# Patient Record
Sex: FEMALE | Race: WHITE | NOT HISPANIC OR LATINO
[De-identification: names, ages, dates, MRNs, and addresses within clinical notes are randomized per-mention and may not be internally consistent; named-entity substitution may affect disease eponyms.]

---

## 2020-07-18 ENCOUNTER — TRANSCRIPTION ENCOUNTER (OUTPATIENT)
Age: 35
End: 2020-07-18

## 2020-08-17 ENCOUNTER — TRANSCRIPTION ENCOUNTER (OUTPATIENT)
Age: 35
End: 2020-08-17

## 2020-12-02 ENCOUNTER — TRANSCRIPTION ENCOUNTER (OUTPATIENT)
Age: 35
End: 2020-12-02

## 2021-04-05 ENCOUNTER — APPOINTMENT (OUTPATIENT)
Dept: PULMONOLOGY | Facility: CLINIC | Age: 36
End: 2021-04-05

## 2021-12-23 ENCOUNTER — TRANSCRIPTION ENCOUNTER (OUTPATIENT)
Age: 36
End: 2021-12-23

## 2022-04-11 ENCOUNTER — TRANSCRIPTION ENCOUNTER (OUTPATIENT)
Age: 37
End: 2022-04-11

## 2022-09-09 ENCOUNTER — NON-APPOINTMENT (OUTPATIENT)
Age: 37
End: 2022-09-09

## 2023-04-02 ENCOUNTER — NON-APPOINTMENT (OUTPATIENT)
Age: 38
End: 2023-04-02

## 2023-04-06 ENCOUNTER — OUTPATIENT (OUTPATIENT)
Dept: OUTPATIENT SERVICES | Facility: HOSPITAL | Age: 38
LOS: 1 days | End: 2023-04-06
Payer: COMMERCIAL

## 2023-04-06 ENCOUNTER — APPOINTMENT (OUTPATIENT)
Dept: CT IMAGING | Facility: CLINIC | Age: 38
End: 2023-04-06

## 2023-04-06 DIAGNOSIS — Z00.8 ENCOUNTER FOR OTHER GENERAL EXAMINATION: ICD-10-CM

## 2023-04-06 PROCEDURE — 74176 CT ABD & PELVIS W/O CONTRAST: CPT | Mod: 26

## 2023-04-06 PROCEDURE — 74176 CT ABD & PELVIS W/O CONTRAST: CPT

## 2023-04-17 DIAGNOSIS — Z01.812 ENCOUNTER FOR PREPROCEDURAL LABORATORY EXAMINATION: ICD-10-CM

## 2023-04-18 ENCOUNTER — APPOINTMENT (OUTPATIENT)
Dept: MATERNAL FETAL MEDICINE | Facility: CLINIC | Age: 38
End: 2023-04-18
Payer: COMMERCIAL

## 2023-04-18 ENCOUNTER — APPOINTMENT (OUTPATIENT)
Dept: ANTEPARTUM | Facility: CLINIC | Age: 38
End: 2023-04-18

## 2023-04-18 VITALS
OXYGEN SATURATION: 99 % | RESPIRATION RATE: 16 BRPM | DIASTOLIC BLOOD PRESSURE: 100 MMHG | HEART RATE: 68 BPM | WEIGHT: 211 LBS | BODY MASS INDEX: 33.91 KG/M2 | SYSTOLIC BLOOD PRESSURE: 162 MMHG | HEIGHT: 66 IN

## 2023-04-18 DIAGNOSIS — Z63.4 DISAPPEARANCE AND DEATH OF FAMILY MEMBER: ICD-10-CM

## 2023-04-18 DIAGNOSIS — Z82.49 FAMILY HISTORY OF ISCHEMIC HEART DISEASE AND OTHER DISEASES OF THE CIRCULATORY SYSTEM: ICD-10-CM

## 2023-04-18 DIAGNOSIS — Z31.7 ENCOUNTER FOR PROCREATIVE MANAGEMENT AND COUNSELING FOR GESTATIONAL CARRIER: ICD-10-CM

## 2023-04-18 DIAGNOSIS — Z31.69 ENCOUNTER FOR OTHER GENERAL COUNSELING AND ADVICE ON PROCREATION: ICD-10-CM

## 2023-04-18 DIAGNOSIS — E66.9 OBESITY, UNSPECIFIED: ICD-10-CM

## 2023-04-18 DIAGNOSIS — Z87.59 PERSONAL HISTORY OF OTHER COMPLICATIONS OF PREGNANCY, CHILDBIRTH AND THE PUERPERIUM: ICD-10-CM

## 2023-04-18 DIAGNOSIS — Z87.51 PERSONAL HISTORY OF PRE-TERM LABOR: ICD-10-CM

## 2023-04-18 DIAGNOSIS — Z84.89 FAMILY HISTORY OF OTHER SPECIFIED CONDITIONS: ICD-10-CM

## 2023-04-18 DIAGNOSIS — R54 AGE-RELATED PHYSICAL DEBILITY: ICD-10-CM

## 2023-04-18 DIAGNOSIS — Z86.59 PERSONAL HISTORY OF OTHER COMPLICATIONS OF PREGNANCY, CHILDBIRTH AND THE PUERPERIUM: ICD-10-CM

## 2023-04-18 DIAGNOSIS — R03.0 ELEVATED BLOOD-PRESSURE READING, W/OUT DIAGNOSIS OF HYPERTENSION: ICD-10-CM

## 2023-04-18 PROCEDURE — 99205 OFFICE O/P NEW HI 60 MIN: CPT

## 2023-04-18 SDOH — SOCIAL STABILITY - SOCIAL INSECURITY: DISSAPEARANCE AND DEATH OF FAMILY MEMBER: Z63.4

## 2023-04-27 PROBLEM — E66.9 OBESITY: Status: ACTIVE | Noted: 2023-04-18

## 2023-04-27 PROBLEM — R03.0 ELEVATED BP WITHOUT DIAGNOSIS OF HYPERTENSION: Status: ACTIVE | Noted: 2023-04-27

## 2023-04-27 PROBLEM — Z31.69 ENCOUNTER FOR PRECONCEPTION CONSULTATION: Status: ACTIVE | Noted: 2023-04-18

## 2023-04-27 PROBLEM — R54 ADVANCED AGE: Status: ACTIVE | Noted: 2023-04-18

## 2023-04-30 NOTE — ACTIVE PROBLEMS
[Diabetes Mellitus] : no diabetes mellitus [Hypertension] : no hypertension [Heart Disease] : no heart disease [Autoimmune Disease] : no autoimmune disease [Renal Disease] : no kidney disease, no UTI [Neurologic Disorder] : no neurologic disorder, no epilepsy [Psychiatric Disorders] : no psychiatric disorders [Depression] : no depression, no post partum depression [Hepatic Disorder] : no hepatitis, no liver disease [Thrombophlebitis] : no varicosities, no phlebitis [Thyroid Disorder] : no thyroid dysfunction [Trauma] : no trauma/violence [Blood Transfusion (___ Ml)] : no history of blood transfusion [FreeTextEntry1] : Patient and her  are SMA carriers\par

## 2023-04-30 NOTE — DATA REVIEWED
[FreeTextEntry1] : 3/13/23 - ultrasound with bulky uterus, 10 week size, endometrial lining 7mm, IUD in place, ovaries sonographically normal\par \par 3/14/23 - PAP negative for intraepithelial lesion or malignancy, HPV not detected\par \par 23 - CT abdomen/pelvis - 2.5 cm nodular density of the left lower quadrant abdominal wall at the level of the  section scar.  4 mm right lower lobe pulmonary nodule.

## 2023-04-30 NOTE — DISCUSSION/SUMMARY
[FreeTextEntry1] : Chantal Ames present for preconceptual counseling as she is applying to become a gestational carrier.  She presents to discuss risks to any future pregnancy. \par \par Regarding potential genetic risk, she is AMA and a SMA carrier.  She understands that as a gestational carrier, she would not be using her own oocytes for conception, so the genetic risks associated with AMA and SMA are not clinically significant. However, the risks for preeclampsia,  labor, fetal growth restriction, stillbirth and  delivery all increase with increasing maternal age.  Low dose aspirin therapy is recommended as in her prior pregnancies.  Serial ultrasound evaluation of fetal growth and third trimester testing is also recommended.   \par \par Nasra has a history of indicated   delivery complicated by preeclampsia. During our consultation, we reviewed the recurrence risk of eclampsia, preeclampsia, placental abruption and other manifestation of placental disease (FGR and stillbirth) given her obstetrical history. While it is reassuring that she has had two full deliveries after her  delivery that were not complicated by a hypertensive disorder, she remains at increased risk for this complication. Additionally, her BP today is elevated and it is important that underlying chronic hypertension is excluded as women with hypertension in pregnancy have an increased risk of complication, mainly superimposed preeclampsia, stillbirth, fetal growth restriction, and  delivery which is mostly iatrogenic secondary to preeclampsia. She was advised to make an appointment with her primary care provider for further evaluation. Pregnancy is not advised until normotensive. Again, low dose aspirin therapy would be advised at the time of any future pregnancy. \par \par Obstetrical history is significant for three prior  deliveries. She reports no intraoperative or postpartum complications.  She is not a candidate for TOLAC. Her risk of prior uterine surgery puts her at increased risk for abnormal pregnancy implantation ( scar pregnancy) and abnormal placental implantation (placenta accreta/percreta/increta).  Careful evaluation of pregnancy implantation in the first trimester and placental implantation throughout pregnancy is recommended. Second trimester MSAFP will also modify her risk for placental implantation abnormality.  Her history of CD puts her at increased risk for surgical complications in a future pregnancy.  Additional risks include adhesion formation, blood loss, injury to bowel/bladder/adjacent organs, potential need for blood transfusion, and potential hysterectomy. \par \par Finally, we reviewed dietary and lifestyle modifications to optimize pregnancy outcome. She was advised to make sure health maintenance exams and vaccinations are up to date. Prenatal vitamin supplementation with at least 400 mcg of folic acid is recommended. Her current BMI increases the risk of  complication, including but not limited to diabetes, hypertension,  delivery, and birth trauma. She was advised that achieving pregnancy at her ideal body weight is recommended and will decrease the risk of  complication in any future pregnancy. All questions were answered to the best of my ability.

## 2023-04-30 NOTE — OB HISTORY
[Pregnancy History] : patient received anesthesia [___] : pregnancy complications occured [Definite:  ___ (Date)] : the last menstrual period was [unfilled] [Normal Amount/Duration] : was of a normal amount and duration [Regular Cycle Intervals] : periods have been regular [Menstrual Cramps] : menstrual cramps [Spotting Between  Menses] : no spotting between menses

## 2023-04-30 NOTE — HISTORY OF PRESENT ILLNESS
[FreeTextEntry1] : Chantal Ames is a 38 y.o.  with LMP of 3/29/23 who presents for preconception consultation as she is pursuing application to be a gestational carrier.  Her BMI is 34 kg/m2. Chantal has a history of indicated   delivery at 29 weeks complicated by preeclampsia, repeat full term CD with  demise secondary to SMA, and repeat full term CD complicated by postpartum depression.  She reports no hypertensive disorder outside of pregnancy; however, BP today was 162/100.  She is currently using an IUD for contraception and reports regular menses. On the day of consultation, she feels well and reports no obstetrical or constitutional complaint.

## 2023-04-30 NOTE — FAMILY HISTORY
[Reported Family History Of Birth Defects] : no congenital heart defects [Aamir-Sachs Carrier] : no Aamir-Sachs [Family History] : no mental retardation/autism [Reported Family History Of Genetic Disease] : no history of child defect in child of baby father [FreeTextEntry1] : Pt/FOB/2 Daughters-SMA Carriers; Son  from SMA Type 1

## 2023-08-25 ENCOUNTER — NON-APPOINTMENT (OUTPATIENT)
Age: 38
End: 2023-08-25

## 2023-11-23 ENCOUNTER — NON-APPOINTMENT (OUTPATIENT)
Age: 38
End: 2023-11-23

## 2024-06-30 ENCOUNTER — NON-APPOINTMENT (OUTPATIENT)
Age: 39
End: 2024-06-30

## 2025-03-27 ENCOUNTER — NON-APPOINTMENT (OUTPATIENT)
Age: 40
End: 2025-03-27